# Patient Record
Sex: MALE | Race: WHITE | HISPANIC OR LATINO | Employment: FULL TIME | URBAN - METROPOLITAN AREA
[De-identification: names, ages, dates, MRNs, and addresses within clinical notes are randomized per-mention and may not be internally consistent; named-entity substitution may affect disease eponyms.]

---

## 2018-02-19 ENCOUNTER — OFFICE VISIT (OUTPATIENT)
Dept: OCCUPATIONAL MEDICINE | Facility: CLINIC | Age: 58
End: 2018-02-19
Payer: OTHER MISCELLANEOUS

## 2018-02-19 VITALS
HEIGHT: 67 IN | SYSTOLIC BLOOD PRESSURE: 140 MMHG | DIASTOLIC BLOOD PRESSURE: 92 MMHG | BODY MASS INDEX: 31.71 KG/M2 | HEART RATE: 84 BPM | TEMPERATURE: 99 F | WEIGHT: 202 LBS

## 2018-02-19 DIAGNOSIS — Z02.83 ENCOUNTER FOR DRUG SCREENING: ICD-10-CM

## 2018-02-19 DIAGNOSIS — S65.311A LACERATION OF DEEP PALMAR ARCH OF RIGHT HAND, INITIAL ENCOUNTER: Primary | ICD-10-CM

## 2018-02-19 LAB — POC BREATH ALCOHOL: NORMAL

## 2018-02-19 PROCEDURE — 3008F BODY MASS INDEX DOCD: CPT | Mod: S$GLB,,, | Performed by: PHYSICIAN ASSISTANT

## 2018-02-19 PROCEDURE — 12001 RPR S/N/AX/GEN/TRNK 2.5CM/<: CPT | Mod: S$GLB,,, | Performed by: PHYSICIAN ASSISTANT

## 2018-02-19 PROCEDURE — 80305 DRUG TEST PRSMV DIR OPT OBS: CPT | Mod: S$GLB,,, | Performed by: PREVENTIVE MEDICINE

## 2018-02-19 PROCEDURE — 82075 ASSAY OF BREATH ETHANOL: CPT | Mod: S$GLB,,, | Performed by: PHYSICIAN ASSISTANT

## 2018-02-19 PROCEDURE — 90714 TD VACC NO PRESV 7 YRS+ IM: CPT | Mod: S$GLB,,, | Performed by: PREVENTIVE MEDICINE

## 2018-02-19 PROCEDURE — 90471 IMMUNIZATION ADMIN: CPT | Mod: S$GLB,,, | Performed by: PREVENTIVE MEDICINE

## 2018-02-19 PROCEDURE — 99203 OFFICE O/P NEW LOW 30 MIN: CPT | Mod: 25,S$GLB,, | Performed by: PHYSICIAN ASSISTANT

## 2018-02-19 RX ORDER — ATORVASTATIN CALCIUM 20 MG/1
20 TABLET, FILM COATED ORAL DAILY
COMMUNITY

## 2018-02-19 RX ORDER — GLIMEPIRIDE 1 MG/1
1 TABLET ORAL
COMMUNITY

## 2018-02-19 RX ORDER — ACETAMINOPHEN AND CODEINE PHOSPHATE 300; 30 MG/1; MG/1
1 TABLET ORAL EVERY 6 HOURS PRN
Qty: 20 TABLET | Refills: 0 | Status: SHIPPED | OUTPATIENT
Start: 2018-02-19 | End: 2018-02-26

## 2018-02-19 RX ORDER — DOXYCYCLINE HYCLATE 100 MG
100 TABLET ORAL 2 TIMES DAILY
Qty: 14 TABLET | Refills: 0 | Status: SHIPPED | OUTPATIENT
Start: 2018-02-19 | End: 2018-02-21 | Stop reason: SDUPTHER

## 2018-02-19 NOTE — PROCEDURES
Laceration Repair  Date/Time: 2/19/2018 12:36 PM  Performed by: JENN HARMAN  Authorized by: JENN HARMAN   Body area: upper extremity  Location details: right hand  Laceration length: 1.5 cm  Tendon involvement: none  Nerve involvement: none  Vascular damage: no  Anesthesia: local infiltration    Anesthesia:  Local Anesthetic: lidocaine 1% without epinephrine  Anesthetic total: 5 mL  Preparation: Patient was prepped and draped in the usual sterile fashion.  Irrigation solution: betadine/tap water solution.  Irrigation method: syringe  Amount of cleaning: standard  Debridement: none  Degree of undermining: none  Skin closure: 4-0 Prolene  Number of sutures: 4  Technique: simple  Approximation: close  Approximation difficulty: simple  Dressing: dressing applied  Patient tolerance: Patient tolerated the procedure well with no immediate complications

## 2018-02-19 NOTE — PATIENT INSTRUCTIONS
Extremity Laceration: Sutures, Staples, or Tape  A laceration is a cut through the skin. If it is deep, it may require stitches (sutures) or staples to close so it can heal. Minor cuts may be treated with surgical tape closures.   X-rays may be done if something may have entered the skin through the cut. You may also need a tetanus shot if you are not up to date on this vaccination.  Home care  · Follow the health care providers instructions on how to care for the cut.  · Wash your hands with soap and warm water before and after caring for your wound. This is to help prevent infection.  · Keep the wound clean and dry. If a bandage was applied and it becomes wet or dirty, replace it. Otherwise, leave it in place for the first 24 hours, then change it once a day or as directed.  · If sutures or staples were used, clean the wound daily:  · After removing the bandage, wash the area with soap and water. Use a wet cotton swab to loosen and remove any blood or crust that forms.  · After cleaning, keep the wound clean and dry. Talk with your doctor before applying any antibiotic ointment to the wound. Reapply the bandage.  · You may remove the bandage to shower as usual after the first 24 hours, but do not soak the area in water (no swimming) until the stitches or staples are removed.  · If surgical tape closures were used, keep the area clean and dry. If it becomes wet, blot it dry with a towel.  · The doctor may prescribe an antibiotic cream or ointment to prevent infection. Do not stop taking this medication until you have finished the prescribed course or the doctor tells you to stop. The doctor may also prescribe medications for pain. Follow the doctors instructions for taking these medications.  · Avoid activities that may reopen your wound.  Follow-up care  Follow up with your health care provider. Most skin wounds heal within ten days. However, an infection may sometimes occur despite proper treatment.  Therefore, check the wound daily for the signs of infection listed below. Stitches and staples should be removed within 7-14 days. If surgical tape closures were used, you may remove them after 10 days if they have not fallen off by then.   When to seek medical advice  Call your health care provider right away if any of these occur:  · Wound bleeding not controlled by direct pressure  · Signs of infection, including increasing pain in the wound, increasing wound redness or swelling, or pus or bad odor coming from the wound  · Fever of 100.4°F (38ºC) or higher or as directed by your healthcare provider  · Stitches or staples come apart or fall out or surgical tape falls off before 7 days  · Wound edges re-open  · Wound changes colors  · Numbness around the wound   · Decreased movement around the injured area  Date Last Reviewed: 6/14/2015  © 2677-0208 [employer] [place]. [address] [place] [address], [place] [address]. All rights reserved. This information is not intended as a substitute for professional medical care. Always follow your healthcare professional's instructions.

## 2018-02-19 NOTE — PROGRESS NOTES
Subjective:       Patient ID: Moi Matta is a 57 y.o. male.    Chief Complaint: Laceration (02/19/18 RIGHT HAND)    PT is an  at [employer].  He was walkling and tripped over a wire cutting his RIGHT HAND on an angle iron.  His TD is not up to date.  Ct      Laceration    The incident occurred 1 to 3 hours ago. The laceration is located on the right hand. The laceration mechanism was a metal edge. The pain is at a severity of 2/10. The pain is mild. The pain has been constant since onset. It is unknown if a foreign body is present. His tetanus status is out of date.     Review of Systems   Constitution: Negative for chills, fever and weakness.   HENT: Negative for congestion, ear pain, nosebleeds and tinnitus.    Eyes: Negative for blurred vision and pain.   Cardiovascular: Negative for chest pain and palpitations.   Respiratory: Negative for cough, shortness of breath and wheezing.    Hematologic/Lymphatic: Does not bruise/bleed easily.   Skin: Negative for dry skin, itching, poor wound healing, rash and skin cancer.   Musculoskeletal: Negative for back pain, joint pain, muscle weakness, neck pain and stiffness.   Gastrointestinal: Negative for abdominal pain, constipation, diarrhea, nausea and vomiting.   Genitourinary: Negative for dysuria and hematuria.   Neurological: Negative for dizziness, headaches, numbness and seizures.   Allergic/Immunologic: Negative for hives.       Objective:      Physical Exam   Constitutional: He appears well-developed and well-nourished. He is active.   HENT:   Head: Normocephalic and atraumatic.   Right Ear: Hearing and external ear normal.   Left Ear: Hearing and external ear normal.   Nose: Nose normal. No epistaxis.   Mouth/Throat: Oropharynx is clear and moist and mucous membranes are normal.   Eyes: Conjunctivae and lids are normal.   Neck: Trachea normal.   Cardiovascular: Intact distal pulses and normal pulses.    Pulmonary/Chest: Effort normal. No stridor. No  respiratory distress.   Musculoskeletal: Normal range of motion.        Right hand: He exhibits laceration. He exhibits normal range of motion, no tenderness, normal capillary refill and no swelling. Normal sensation noted.        Hands:  Neurological: He is alert. He is not disoriented. No sensory deficit. GCS eye subscore is 4. GCS verbal subscore is 5. GCS motor subscore is 6.   Skin: Skin is warm and dry. Capillary refill takes less than 2 seconds. Laceration noted.   Psychiatric: He has a normal mood and affect. His speech is normal and behavior is normal.   Nursing note and vitals reviewed.      Assessment:       1. Laceration of deep palmar arch of right hand, initial encounter    2. Encounter for drug screening        Plan:           Medications Ordered This Encounter      acetaminophen-codeine 300-30mg (TYLENOL #3) 300-30 mg Tab          Sig: Take 1 tablet by mouth every 6 (six) hours as needed (Take off duty only.).          Dispense:  20 tablet          Refill:  0      doxycycline (VIBRA-TABS) 100 MG tablet          Sig: Take 1 tablet (100 mg total) by mouth 2 (two) times daily.          Dispense:  14 tablet          Refill:  0  Patient Instructions: Keep dressing clean/dry/covered   Restrictions: Regular Duty  Follow-up in about 2 days (around 2/21/2018).

## 2018-02-19 NOTE — LETTER
[facility] - [place] [address]  [place] [address]  Phone: [phone]  Fax: [phone]    Pt Name: Moi Matta  Injury Date: 02/19/2018   Employee ID:  Date of Treatment: 02/19/2018   Company: [employer]            Appointment Time: 09:45 AM Arrived: 10:00 AM CST   Appointment Date: [unfilled] Time Out:1205 PM    Physician: Omero Vasquez PA-C        Office Treatment: Moi was seen today for laceration.    Diagnoses and all orders for this visit: EXAM, TD BOOSTER, SUTURES, DRUG SCREEN, ALCOHOL SCREEN, REGULAR DUTY    Laceration of deep palmar arch of right hand, initial encounter  -     Td Vaccine (Adult)  -     doxycycline (VIBRA-TABS) 100 MG tablet; Take 1 tablet (100 mg total) by mouth 2 (two) times daily.  -     acetaminophen-codeine 300-30mg (TYLENOL #3) 300-30 mg Tab; Take 1 tablet by mouth every 6 (six) hours as needed (Take off duty only.).  Encounter for drug screening  -     POCT alcohol breath test     Patient Instructions: Keep dressing clean/dry/covered    Restrictions: Regular Duty       Return Appointment: 2/21/2018 at 0930 AM

## 2018-02-21 ENCOUNTER — OFFICE VISIT (OUTPATIENT)
Dept: OCCUPATIONAL MEDICINE | Facility: CLINIC | Age: 58
End: 2018-02-21
Payer: OTHER MISCELLANEOUS

## 2018-02-21 DIAGNOSIS — S65.311A LACERATION OF DEEP PALMAR ARCH OF RIGHT HAND, INITIAL ENCOUNTER: ICD-10-CM

## 2018-02-21 DIAGNOSIS — S61.411D LACERATION OF RIGHT HAND WITHOUT FOREIGN BODY, SUBSEQUENT ENCOUNTER: ICD-10-CM

## 2018-02-21 PROBLEM — S61.411A LACERATION OF RIGHT HAND WITHOUT FOREIGN BODY: Status: ACTIVE | Noted: 2018-02-21

## 2018-02-21 PROCEDURE — 99024 POSTOP FOLLOW-UP VISIT: CPT | Mod: S$GLB,,, | Performed by: PHYSICIAN ASSISTANT

## 2018-02-21 RX ORDER — DOXYCYCLINE 100 MG/1
100 CAPSULE ORAL EVERY 12 HOURS
Qty: 14 CAPSULE | Refills: 0 | Status: SHIPPED | OUTPATIENT
Start: 2018-02-21 | End: 2018-02-28

## 2018-02-21 NOTE — LETTER
[facility] - [place] [address]  [place] [address]  Phone: [phone]  Fax: [phone]    Pt Name: Moi Matta  Injury Date: 02/19/2018   Employee ID:  Date of Treatment: 02/21/2018   Company: [employer]            Appointment Time: 09:15 AM Arrived:  9:30 AM CST   Appointment Date: 02/21/2018 Time Out: 10:10 AM   Physician: Omero Vasquez PA-C        Office Treatment: Moi was seen today for hand injury.    Diagnoses and all orders for this visit: EXAM, WOUND DRESSING, FOLLOW UP IN ONE WEEK, REGULAR DUTY    Laceration of right hand without foreign body, subsequent encounter    Laceration of deep palmar arch of right hand, initial encounter  -     doxycycline (MONODOX) 100 MG capsule; Take 1 capsule (100 mg total) by mouth every 12 (twelve) hours.     Patient Instructions: Keep dressing clean/dry/covered, Do not apply ointments or creams unless directed    Restrictions: Regular Duty       Return Appointment: 02/28/2018 @ 9:30 AM

## 2018-02-21 NOTE — PROGRESS NOTES
Subjective:       Patient ID: Moi Matta is a 57 y.o. male.    Chief Complaint: Hand Injury (02/19/18)    Pt returned to the clinic today for a follow up visit for a right hand laceration. Pt states his injury has improved since his last office visit but he still has some tenderness. Pt also states when he went to fill his scripts he was not given his antibiotics. IJ        Hand Injury    His dominant hand is their right hand. The incident occurred 3 to 5 days ago. The incident occurred at work. The injury mechanism was a direct blow. The pain is present in the right hand. The quality of the pain is described as aching. The pain does not radiate. The pain is at a severity of 1/10. The pain is mild. The pain has been improving since the incident. Pertinent negatives include no chest pain. The symptoms are aggravated by lifting. He has tried NSAIDs for the symptoms. The treatment provided mild relief.     Review of Systems   Constitution: Negative for chills and fever.   HENT: Negative for sore throat.    Eyes: Negative for blurred vision.   Cardiovascular: Negative for chest pain.   Respiratory: Negative for shortness of breath.    Skin: Negative for color change, poor wound healing and rash.   Musculoskeletal: Positive for stiffness. Negative for back pain and joint pain.   Gastrointestinal: Negative for abdominal pain, diarrhea, nausea and vomiting.   Neurological: Negative for headaches.   Psychiatric/Behavioral: The patient is not nervous/anxious.        Objective:      Physical Exam   Constitutional: He appears well-developed and well-nourished. He is active.   HENT:   Head: Normocephalic and atraumatic.   Right Ear: Hearing and external ear normal.   Left Ear: Hearing and external ear normal.   Nose: Nose normal. No epistaxis.   Mouth/Throat: Oropharynx is clear and moist and mucous membranes are normal.   Eyes: Conjunctivae and lids are normal.   Neck: Trachea normal.   Cardiovascular: Intact distal  pulses and normal pulses.    Pulmonary/Chest: Effort normal. No stridor. No respiratory distress.   Musculoskeletal: Normal range of motion.        Right hand: He exhibits normal range of motion, normal capillary refill, no deformity and no swelling. Normal sensation noted.   Neurological: He is alert. He is not disoriented. No sensory deficit. GCS eye subscore is 4. GCS verbal subscore is 5. GCS motor subscore is 6.   Skin: Skin is warm and dry. Capillary refill takes less than 2 seconds. Laceration (right hand, healing well, sutures intact, good approximation, no SSI.) noted.   Psychiatric: He has a normal mood and affect. His speech is normal and behavior is normal.   Nursing note reviewed.      Assessment:       1. Laceration of right hand without foreign body, subsequent encounter    2. Laceration of deep palmar arch of right hand, initial encounter        Plan:           Medications Ordered This Encounter      doxycycline (MONODOX) 100 MG capsule          Sig: Take 1 capsule (100 mg total) by mouth every 12 (twelve) hours.          Dispense:  14 capsule          Refill:  0  Patient Instructions: Keep dressing clean/dry/covered, Do not apply ointments or creams unless directed   Restrictions: Regular Duty  Follow-up in about 1 week (around 2/28/2018).

## 2018-02-28 ENCOUNTER — OFFICE VISIT (OUTPATIENT)
Dept: OCCUPATIONAL MEDICINE | Facility: CLINIC | Age: 58
End: 2018-02-28
Payer: OTHER MISCELLANEOUS

## 2018-02-28 DIAGNOSIS — S61.411D LACERATION OF RIGHT HAND WITHOUT FOREIGN BODY, SUBSEQUENT ENCOUNTER: Primary | ICD-10-CM

## 2018-02-28 PROBLEM — S61.411A LACERATION OF RIGHT HAND WITHOUT FOREIGN BODY: Status: RESOLVED | Noted: 2018-02-21 | Resolved: 2018-02-28

## 2018-02-28 PROCEDURE — 99024 POSTOP FOLLOW-UP VISIT: CPT | Mod: S$GLB,,, | Performed by: PHYSICIAN ASSISTANT

## 2018-02-28 NOTE — PROGRESS NOTES
Subjective:       Patient ID: Moi Matta is a 57 y.o. male.    Chief Complaint: Hand Injury (right)    Patient returns for a follow up to a wound suture on his right hand      Hand Injury    His dominant hand is their right hand. Incident onset: 02/19/2018. The incident occurred at work. Injury mechanism: laceration. The pain is present in the right hand. The pain does not radiate. The pain is at a severity of 0/10. Pertinent negatives include no chest pain or numbness.     Review of Systems   Constitution: Negative for chills, fever, weakness and malaise/fatigue.   HENT: Negative for nosebleeds.    Cardiovascular: Negative for chest pain and syncope.   Respiratory: Negative for shortness of breath.    Skin: Negative for color change and poor wound healing.   Musculoskeletal: Negative for back pain, joint pain and neck pain.   Gastrointestinal: Negative for abdominal pain.   Genitourinary: Negative for hematuria.   Neurological: Negative for dizziness and numbness.   All other systems reviewed and are negative.      Objective:      Physical Exam   Constitutional: He appears well-developed and well-nourished. He is active.   HENT:   Head: Normocephalic and atraumatic.   Right Ear: Hearing and external ear normal.   Left Ear: Hearing and external ear normal.   Nose: Nose normal. No epistaxis.   Mouth/Throat: Oropharynx is clear and moist and mucous membranes are normal.   Eyes: Conjunctivae and lids are normal.   Neck: Trachea normal.   Cardiovascular: Intact distal pulses and normal pulses.    Pulmonary/Chest: Effort normal. No stridor. No respiratory distress.   Musculoskeletal: Normal range of motion.   Neurological: He is alert. He is not disoriented. No sensory deficit. GCS eye subscore is 4. GCS verbal subscore is 5. GCS motor subscore is 6.   Skin: Skin is warm and dry. Capillary refill takes less than 2 seconds. Laceration (right hand, healing well, no SSI, sutures intact, wound closed. ) noted.    Psychiatric: He has a normal mood and affect. His speech is normal and behavior is normal.   Nursing note reviewed.      Assessment:       1. Laceration of right hand without foreign body, subsequent encounter        Plan:            Patient Instructions: Keep dressing clean/dry/covered (Continue doxycycline until complete. Naproxen as needed for pain. )   Restrictions: Regular Duty, Discharged from Occupational Health  Follow-up if symptoms worsen or fail to improve.

## 2018-02-28 NOTE — PROCEDURES
Suture Removal  Date/Time: 2/28/2018 10:22 AM  Location procedure was performed: [facility]  Performed by: JENN HARMAN  Authorized by: JENN HARMAN   Pre-operative diagnosis: Laceration right hand  Post-operative diagnosis: Laceration right hand  Body area: upper extremity  Location details: right hand  Wound Appearance: clean, well healed, normal color, nontender and no drainage  Sutures Removed: 4  Post-removal: bandaid applied  Facility: sutures placed in this facility  Complications: No  Estimated blood loss (mL): 0  Specimens: No  Implants: No  Patient tolerance: Patient tolerated the procedure well with no immediate complications

## 2018-02-28 NOTE — LETTER
[facility]  [place]  [address] [place] [address]  Phone: [phone]  Fax: [phone]    Pt Name: Moi Matta  Injury Date: 02/19/2018   Employee ID: [id number] Date of Treatment: 02/28/2018   Company: [employer]            Appointment Time: 09:15 AM Arrived:  9:30 AM CST   Physician: Omero Vasquez PA-C Time Out:10:10 AM       Office Treatment: Moi was seen today for hand injury.    Diagnoses and all orders for this visit:    Laceration of right hand without foreign body, subsequent encounter       Patient Instructions: Keep dressing clean/dry/covered (Continue doxycycline until complete. Naproxen as needed for pain. )    Restrictions: Regular Duty, Discharged from Occupational Health       Return Appointment: DISCHARGED

## 2020-04-29 ENCOUNTER — OFFICE VISIT (OUTPATIENT)
Dept: URGENT CARE | Facility: CLINIC | Age: 60
End: 2020-04-29
Payer: COMMERCIAL

## 2020-04-29 VITALS
HEART RATE: 74 BPM | OXYGEN SATURATION: 98 % | TEMPERATURE: 99 F | DIASTOLIC BLOOD PRESSURE: 81 MMHG | WEIGHT: 202 LBS | RESPIRATION RATE: 18 BRPM | HEIGHT: 67 IN | SYSTOLIC BLOOD PRESSURE: 126 MMHG | BODY MASS INDEX: 31.71 KG/M2

## 2020-04-29 DIAGNOSIS — Z02.6 ENCOUNTER RELATED TO WORKER'S COMPENSATION CLAIM: ICD-10-CM

## 2020-04-29 DIAGNOSIS — S70.01XA CONTUSION OF RIGHT HIP, INITIAL ENCOUNTER: Primary | ICD-10-CM

## 2020-04-29 DIAGNOSIS — M25.551 RIGHT HIP PAIN: ICD-10-CM

## 2020-04-29 PROCEDURE — 73502 X-RAY EXAM HIP UNI 2-3 VIEWS: CPT | Mod: FY,RT,S$GLB, | Performed by: RADIOLOGY

## 2020-04-29 PROCEDURE — 99203 PR OFFICE/OUTPT VISIT, NEW, LEVL III, 30-44 MIN: ICD-10-PCS | Mod: S$GLB,,, | Performed by: PHYSICIAN ASSISTANT

## 2020-04-29 PROCEDURE — 80305 OOH NON-DOT DRUG SCREEN: ICD-10-PCS | Mod: S$GLB,,, | Performed by: PHYSICIAN ASSISTANT

## 2020-04-29 PROCEDURE — 73502 XR HIP 2 VIEW RIGHT: ICD-10-PCS | Mod: FY,RT,S$GLB, | Performed by: RADIOLOGY

## 2020-04-29 PROCEDURE — 99203 OFFICE O/P NEW LOW 30 MIN: CPT | Mod: S$GLB,,, | Performed by: PHYSICIAN ASSISTANT

## 2020-04-29 PROCEDURE — 80305 DRUG TEST PRSMV DIR OPT OBS: CPT | Mod: S$GLB,,, | Performed by: PHYSICIAN ASSISTANT

## 2020-04-29 RX ORDER — AMLODIPINE BESYLATE 5 MG/1
5 TABLET ORAL
COMMUNITY
Start: 2019-08-12

## 2020-04-29 NOTE — PROGRESS NOTES
Subjective:       Patient ID: Moi Matta is a 59 y.o. male.    Chief Complaint: Back Pain    Mr. Matta presents for evaluation right hip pain s/p fall at work yesterday.  This occurred around 330pm.  He was walking down a concrete ramp when he and fell on his right hip.  He denies have any head trauma or loss of consciousness.  He denies any other pain.  The pain is worse with walking, improved with rest.  He denies any neck or back pain.  He denies any radiating leg pain, paresthesias, weakness, saddle anesthesia or B/B dysfunction.  He took an Aleve this morning with some relief of pain.    Back Pain   This is a new problem. The current episode started yesterday. The problem occurs constantly. The problem is unchanged. Pain location: rt hip pain. The quality of the pain is described as aching. The pain is at a severity of 9/10. The pain is severe. The pain is the same all the time. The symptoms are aggravated by lying down. Stiffness is present all day. Pertinent negatives include no abdominal pain, bladder incontinence, bowel incontinence, dysuria or numbness. He has tried nothing for the symptoms.       Constitution: Negative for fatigue.   Gastrointestinal: Negative for abdominal pain and bowel incontinence.   Genitourinary: Negative for dysuria, urgency, bladder incontinence and hematuria.   Musculoskeletal: Positive for pain, trauma and joint pain. Negative for back pain, muscle cramps and history of spine disorder.   Skin: Negative for rash.   Neurological: Negative for coordination disturbances, numbness and tingling.        Objective:      Physical Exam   Constitutional: He is oriented to person, place, and time. He appears well-developed and well-nourished.   HENT:   Head: Normocephalic and atraumatic.   Right Ear: Hearing and external ear normal.   Left Ear: Hearing and external ear normal.   Nose: Nose normal. No rhinorrhea or nasal deformity.   Mouth/Throat: Uvula is midline, oropharynx is clear  and moist and mucous membranes are normal.   Eyes: Pupils are equal, round, and reactive to light. Conjunctivae and EOM are normal.   Neck: Normal range of motion.   Cardiovascular: Normal rate and regular rhythm.   Pulmonary/Chest: Effort normal and breath sounds normal.   Musculoskeletal:        Right hip: He exhibits tenderness. He exhibits normal range of motion, normal strength, no bony tenderness, no swelling, no crepitus, no deformity and no laceration.        Legs:  No midline C/T/L spine TTP.  Full ROM C/T/L spine.  TTP right posterior & lateral hip.  No pain with ext/int rotation.    Neurological: He is alert and oriented to person, place, and time.   Skin: Skin is warm and dry.       XR R hip - Two views right hip: No fracture dislocation bone destruction seen.  There is mild DJD and impingement change.    Assessment:       1. Contusion of right hip, initial encounter    2. Encounter related to worker's compensation claim    3. Right hip pain        Plan:       XR R hip is neg.  He will follow up with occupational health tomorrow.  No work until follow up.     Patient Instructions   PLEASE READ YOUR DISCHARGE INSTRUCTIONS ENTIRELY AS IT CONTAINS IMPORTANT INFORMATION.  - Rest.    - Drink plenty of fluids.    - Tylenol or Ibuprofen as directed as needed for fever/pain.    - If you were prescribed antibiotics, please take them to completion.  - If you are female and on birth control pills - please use additional methods of contraception to prevent pregnancy while on antibiotics and for one cycle after.   - If you were prescribed a narcotic medication or muscle relaxer, do not drive or operate heavy equipment or machinery while taking these medications, as they can cause drowsiness.   - If you smoke, please stop smoking.  -You must understand that you've received an Urgent Care treatment only and that you may be released before all your medical problems are known or treated. You, the patient, will    arrange  for follow up care as instructed. Please arrange follow up with your primary medical clinic as soon as possible.   - Follow up with your PCP or specialty clinic as directed in the next 1-2 weeks if not improved or as needed.  You can call [phone] to schedule an appointment with the appropriate provider.    - Please return to Urgent Care or to the Emergency Department if your symptoms worsen.    Patient aware and verbalized understanding.    Hip Contusion    A contusion is another word for a bruise. It happens when small blood vessels break open and leak blood into the nearby area. A hip contusion can result from a bump, hit, or fall. Symptoms of a contusion often include changes in skin color (bruising), swelling, and pain. It may take several hours for a deep bruise to show up. If the injury is severe, you may need an X-ray to check for broken bones. Swelling should decrease in a few days. Bruising and pain may take several weeks to go away.  Home care  · Unless another medicine was prescribed, you may take acetaminophen, ibuprofen, or naproxen to help relieve pain and swelling. If needed, stronger pain medicines may be prescribed. Take all medicines exactly as directed.  · Ice the bruised area to help reduce pain and swelling. Wrap a cold source (ice pack or ice cubes in a plastic bag) in a thin towel. Apply the cold source to the bruised area for 20 minutes every 1 to 2 hours the first day. Continue this 3 to 4 times a day until the pain and swelling goes away.  · If walking causes pain, use crutches or a walker until you can walk without pain. These items can be rented at most pharmacies and orthopedic supply stores.  · If your injury is keeping you from moving around or caring for yourself properly, you may qualify for services such as home healthcare. Check with your doctor and insurance company to see if this type of care is covered.  Follow-up  Follow up with your healthcare provider, or as  advised.  When to seek medical advice   Call your healthcare provider right away if any of these occur:  · Increased pain, bruising, or swelling near the injured area  · Decreased ability to bear weight on the injured side  · Pain or swelling develops below the knee  · Chest pain or shortness of breath  Date Last Reviewed: 4/1/2017  © 1048-2478 [employer]. [address] [place] [address]. All rights reserved. This information is not intended as a substitute for professional medical care. Always follow your healthcare professional's instructions.                      No follow-ups on file.

## 2020-04-29 NOTE — LETTER
[facility] - [place] [address]  [place] [address]  Phone: [phone]  Fax: [phone]  [facility]: [email or web address]    Pt Name: Moi Matta  Injury Date: 04/28/2020   Employee ID:  Date of First Treatment: 04/29/2020   Company: [employer]      Appointment Time: 09:40 AM Arrived: 0955am   Provider: Wanda Sung PA-C Time Out: 11:31am     Office Treatment:   1. Contusion of right hip, initial encounter    2. Encounter related to worker's compensation claim    3. Right hip pain                     Please do not return to work until you follow up with occupational health tomorrow.     Return Appointment: Visit date 4/30/2020

## 2020-04-29 NOTE — PATIENT INSTRUCTIONS
PLEASE READ YOUR DISCHARGE INSTRUCTIONS ENTIRELY AS IT CONTAINS IMPORTANT INFORMATION.  - Rest.    - Drink plenty of fluids.    - Tylenol or Ibuprofen as directed as needed for fever/pain.    - If you were prescribed antibiotics, please take them to completion.  - If you are female and on birth control pills - please use additional methods of contraception to prevent pregnancy while on antibiotics and for one cycle after.   - If you were prescribed a narcotic medication or muscle relaxer, do not drive or operate heavy equipment or machinery while taking these medications, as they can cause drowsiness.   - If you smoke, please stop smoking.  -You must understand that you've received an Urgent Care treatment only and that you may be released before all your medical problems are known or treated. You, the patient, will    arrange for follow up care as instructed. Please arrange follow up with your primary medical clinic as soon as possible.   - Follow up with your PCP or specialty clinic as directed in the next 1-2 weeks if not improved or as needed.  You can call [phone] to schedule an appointment with the appropriate provider.    - Please return to Urgent Care or to the Emergency Department if your symptoms worsen.    Patient aware and verbalized understanding.    Hip Contusion    A contusion is another word for a bruise. It happens when small blood vessels break open and leak blood into the nearby area. A hip contusion can result from a bump, hit, or fall. Symptoms of a contusion often include changes in skin color (bruising), swelling, and pain. It may take several hours for a deep bruise to show up. If the injury is severe, you may need an X-ray to check for broken bones. Swelling should decrease in a few days. Bruising and pain may take several weeks to go away.  Home care  · Unless another medicine was prescribed, you may take acetaminophen, ibuprofen, or naproxen to help relieve pain and swelling. If  needed, stronger pain medicines may be prescribed. Take all medicines exactly as directed.  · Ice the bruised area to help reduce pain and swelling. Wrap a cold source (ice pack or ice cubes in a plastic bag) in a thin towel. Apply the cold source to the bruised area for 20 minutes every 1 to 2 hours the first day. Continue this 3 to 4 times a day until the pain and swelling goes away.  · If walking causes pain, use crutches or a walker until you can walk without pain. These items can be rented at most pharmacies and orthopedic supply stores.  · If your injury is keeping you from moving around or caring for yourself properly, you may qualify for services such as home healthcare. Check with your doctor and insurance company to see if this type of care is covered.  Follow-up  Follow up with your healthcare provider, or as advised.  When to seek medical advice   Call your healthcare provider right away if any of these occur:  · Increased pain, bruising, or swelling near the injured area  · Decreased ability to bear weight on the injured side  · Pain or swelling develops below the knee  · Chest pain or shortness of breath  Date Last Reviewed: 4/1/2017  © 5034-5598 [employer]. [address] [place] [address], [place] [address]. All rights reserved. This information is not intended as a substitute for professional medical care. Always follow your healthcare professional's instructions.

## 2020-04-30 ENCOUNTER — OFFICE VISIT (OUTPATIENT)
Dept: URGENT CARE | Facility: CLINIC | Age: 60
End: 2020-04-30
Payer: COMMERCIAL

## 2020-04-30 VITALS
DIASTOLIC BLOOD PRESSURE: 81 MMHG | SYSTOLIC BLOOD PRESSURE: 132 MMHG | HEIGHT: 66 IN | HEART RATE: 65 BPM | OXYGEN SATURATION: 98 % | TEMPERATURE: 99 F | WEIGHT: 206 LBS | BODY MASS INDEX: 33.11 KG/M2

## 2020-04-30 DIAGNOSIS — S70.01XD CONTUSION OF RIGHT HIP, SUBSEQUENT ENCOUNTER: ICD-10-CM

## 2020-04-30 DIAGNOSIS — M25.551 RIGHT HIP PAIN: Primary | ICD-10-CM

## 2020-04-30 PROCEDURE — 99213 OFFICE O/P EST LOW 20 MIN: CPT | Mod: S$GLB,,, | Performed by: PREVENTIVE MEDICINE

## 2020-04-30 PROCEDURE — 99213 PR OFFICE/OUTPT VISIT, EST, LEVL III, 20-29 MIN: ICD-10-PCS | Mod: S$GLB,,, | Performed by: PREVENTIVE MEDICINE

## 2020-04-30 NOTE — LETTER
[facility]  [address] [facility], [address]  [facility] [address]  Phone: [phone]  Fax: [phone]  [facility]: [id number]    Pt Name: Moi Matta  Injury Date: 02/19/2018   Employee ID:  Date of  Treatment: 04/30/2020   Company: [employer]      Appointment Time: 08:15 AM Arrived: 08:30  am   Provider: Bear Oglesby MD Time Out:09:28  am     Office Treatment:   EXAM  DISCHARGED FROM OCCUPATIONAL HEALTH      1. Right hip pain    2. Contusion of right hip, subsequent encounter          Patient Instructions: Daily home exercises/warm soaks      Restrictions: Regular Duty, Discharged from Occupational Health(May resume regular duty on April 30, 2020)     Return Appointment: DISCHARGED

## 2020-04-30 NOTE — PROGRESS NOTES
Subjective:       Patient ID: Moi Matta is a 59 y.o. male.    Chief Complaint: Hip Injury (right)    NEW - WC- pt is being seen today for his right hip injury at work -[employer]-04/28/2020.PT states that  right hip pain s/p fall at work 04/28/2020,  This occurred around 330pm.  He was walking down a concrete ramp when he and fell on his right hip.  He denies have any head trauma or loss of consciousness.  He denies any other pain. Pt have pain when going to sleep no pain when he walk,  He denies any neck or back pain.  He denies any radiating leg pain, paresthesias, .  He took an Aleve  Last night  with some relief of pain, Pt went to [facility] [place] at the time of the accident with X-Ray done .-LN    Hip Injury   This is a new problem. Associated symptoms include arthralgias. Pertinent negatives include no abdominal pain, chills, congestion, headaches, joint swelling, myalgias, nausea, numbness or weakness. The symptoms are aggravated by standing. He has tried nothing for the symptoms.   Back Pain   This is a new problem. The current episode started yesterday. The problem occurs constantly. The problem is unchanged. Pain location: rt hip pain. The quality of the pain is described as aching. The pain is at a severity of 9/10. The pain is severe. The pain is the same all the time. The symptoms are aggravated by lying down. Stiffness is present all day. Pertinent negatives include no abdominal pain, headaches, numbness or weakness. He has tried nothing for the symptoms.       Constitution: Negative for chills.   HENT: Negative for congestion, sinus pain and sinus pressure.    Gastrointestinal: Negative for abdominal pain and nausea.   Musculoskeletal: Positive for pain and joint pain. Negative for joint swelling, back pain, pain with walking, muscle cramps and muscle ache.   Allergic/Immunologic: Negative for itching and sneezing.   Neurological: Negative for headaches, numbness and tingling.         Objective:      Physical Exam   Constitutional: He is oriented to person, place, and time. He appears well-developed and well-nourished.   HENT:   Head: Normocephalic and atraumatic.   Eyes: Pupils are equal, round, and reactive to light. EOM are normal.   Neck: Normal range of motion.   Cardiovascular: Normal rate.   Pulmonary/Chest: Effort normal.   Musculoskeletal:        Right hip: He exhibits normal range of motion, normal strength, no tenderness, no bony tenderness, no swelling, no crepitus, no deformity and no laceration.        Legs:  Patient has minimal pain about the right hip with no evidence of swelling or ecchymosis.  He has full range of motion of the right hip with no pain on flexion to approximately 90°, abduction to approximately 45°, and extension to approximately 45°.  He has no pain with internal external rotation of his right hip.  He has no pain with weight-bearing, heal and toe walking.  Distal pulses are equal and intact.   Neurological: He is alert and oriented to person, place, and time.   Skin: Skin is warm and dry.   Psychiatric: He has a normal mood and affect.   Nursing note and vitals reviewed.    X-ray Hip 2 Or 3 Views Right    Result Date: 4/29/2020  EXAMINATION: XR HIP 2 VIEW RIGHT CLINICAL HISTORY: right hip pain s/p fall;  Pain in right hip FINDINGS: Two views right hip: No fracture dislocation bone destruction seen.  There is mild DJD and impingement change. Electronically signed by: Margarito Quintanilla MD Date:    04/29/2020 Time:    11:28  Assessment:       1. Right hip pain    2. Contusion of right hip, subsequent encounter        Plan:     patient will use over-the-counter medications such as Advil and or Aleve as needed for pain about his right hip.       Patient Instructions: Daily home exercises/warm soaks   Restrictions: Regular Duty, Discharged from Occupational Health(May resume regular duty on April 30, 2020)  Follow up if symptoms worsen or fail to improve.

## 2020-05-02 ENCOUNTER — TELEPHONE (OUTPATIENT)
Dept: URGENT CARE | Facility: CLINIC | Age: 60
End: 2020-05-02

## 2022-12-19 NOTE — TELEPHONE ENCOUNTER
Call Back DOS 04/29/2020 - I left a message for the patient to return my call.  
appears normal and intact

## 2023-01-27 ENCOUNTER — OFFICE VISIT (OUTPATIENT)
Dept: URGENT CARE | Facility: CLINIC | Age: 63
End: 2023-01-27
Payer: COMMERCIAL

## 2023-01-27 VITALS
HEIGHT: 66 IN | SYSTOLIC BLOOD PRESSURE: 147 MMHG | TEMPERATURE: 98 F | DIASTOLIC BLOOD PRESSURE: 73 MMHG | WEIGHT: 206 LBS | OXYGEN SATURATION: 99 % | HEART RATE: 66 BPM | BODY MASS INDEX: 33.11 KG/M2 | RESPIRATION RATE: 18 BRPM

## 2023-01-27 DIAGNOSIS — M25.532 LEFT WRIST PAIN: Primary | ICD-10-CM

## 2023-01-27 DIAGNOSIS — T14.90XA INJURY: ICD-10-CM

## 2023-01-27 PROCEDURE — 99204 PR OFFICE/OUTPT VISIT, NEW, LEVL IV, 45-59 MIN: ICD-10-PCS | Mod: S$GLB,,,

## 2023-01-27 PROCEDURE — 73110 XR WRIST COMPLETE 3 VIEWS LEFT: ICD-10-PCS | Mod: FY,LT,S$GLB, | Performed by: RADIOLOGY

## 2023-01-27 PROCEDURE — 99204 OFFICE O/P NEW MOD 45 MIN: CPT | Mod: S$GLB,,,

## 2023-01-27 PROCEDURE — 73110 X-RAY EXAM OF WRIST: CPT | Mod: FY,LT,S$GLB, | Performed by: RADIOLOGY

## 2023-01-27 RX ORDER — FENOFIBRATE 48 MG/1
48 TABLET, FILM COATED ORAL
COMMUNITY
Start: 2022-11-21

## 2023-01-27 RX ORDER — MELOXICAM 15 MG/1
15 TABLET ORAL NIGHTLY
COMMUNITY
Start: 2022-11-21

## 2023-01-27 RX ORDER — ESCITALOPRAM OXALATE 10 MG/1
10 TABLET ORAL
COMMUNITY
Start: 2022-11-21

## 2023-01-27 RX ORDER — TAMSULOSIN HYDROCHLORIDE 0.4 MG/1
1 CAPSULE ORAL NIGHTLY
COMMUNITY
Start: 2022-10-07 | End: 2023-08-11

## 2023-01-27 NOTE — LETTER
[facility]  [address] [place] [address]  Phone: [phone]  Fax: [phone]  [facility]: [id number]     Name: Moi Matta  Injury Date: 01/26/2023   Employee ID:  Date of First Treatment: 01/27/2023   Company: [employer]      Appointment Time: 08:40 AM Arrived: 9:05   Provider: Rhonda Goff NP Time Out:10:55     Office Treatment:   1. Left wrist pain    2. Injury                     Return Appointment: F/U with [facility] on 1/28/23

## 2023-01-27 NOTE — PROGRESS NOTES
Subjective:       Patient ID: Moi Matta is a 62 y.o. male.    Chief Complaint: left wrist    62 yr old male came in with complaints of a left wrist injury. He was putting josé down at work and was carrying the josé and fell and hit his wrist when he fell. This injury happened yesterday and at work. He can move it but it hurts. He's also having swelling. Pt denies any numbness or tingling.  Patient's place of employment - [employer]  Patient's job title -   Date of injury - 1/26/23  Body part injured including left or right - left  Injury Mechanism - fall  What they were doing when they got hurt -  moving josé  What they did immediately after - went home  Pain scale right now - 10    Wrist Injury   The incident occurred 12 to 24 hours ago. The incident occurred at work. The injury mechanism was a fall. The pain is present in the left wrist. The quality of the pain is described as aching. The pain does not radiate. The pain is at a severity of 10/10. The pain is severe. The pain has been Constant since the incident. Associated symptoms include muscle weakness. Pertinent negatives include no chest pain, numbness or tingling. Nothing aggravates the symptoms. He has tried nothing for the symptoms.     Cardiovascular:  Negative for chest pain.   Musculoskeletal:  Positive for pain and joint swelling. Negative for abnormal ROM of joint.   Neurological:  Negative for dizziness, light-headedness, numbness and tingling.      Objective:      Physical Exam  Vitals and nursing note reviewed.   Constitutional:       General: He is not in acute distress.     Appearance: Normal appearance. He is normal weight. He is not ill-appearing.   HENT:      Head: Normocephalic and atraumatic.      Nose: Nose normal.      Mouth/Throat:      Mouth: Mucous membranes are moist.   Eyes:      Pupils: Pupils are equal, round, and reactive to light.   Cardiovascular:      Rate and Rhythm: Normal rate and  regular rhythm.      Pulses: Normal pulses.      Heart sounds: Normal heart sounds.   Pulmonary:      Effort: Pulmonary effort is normal.      Breath sounds: Normal breath sounds.   Musculoskeletal:      Left wrist: Swelling, tenderness and bony tenderness present. No deformity. Decreased range of motion.      Comments: TTP over dorsum of hand and wrist, decreased flexion and extension to wrist, mild swelling, no erythema or bruising   Skin:     General: Skin is warm and dry.   Neurological:      General: No focal deficit present.      Mental Status: He is alert and oriented to person, place, and time.   Psychiatric:         Mood and Affect: Mood normal.         Behavior: Behavior normal.         Thought Content: Thought content normal.         Judgment: Judgment normal.     XR WRIST COMPLETE 3 VIEWS LEFT    Result Date: 1/27/2023  EXAMINATION: XR WRIST COMPLETE 3 VIEWS LEFT CLINICAL HISTORY: Injury, unspecified, initial encounter TECHNIQUE: PA, lateral, and oblique views of the left wrist were performed. COMPARISON: None FINDINGS: No fracture.  No malalignment.  Preserved bone density.  Some osteoarthritic periarticular spurring and soft tissue calcifications about the mildly narrowed 1st carpal metacarpal articulation.  Otherwise, preserved joint spaces.  No opaque soft tissue foreign body.  Mild soft tissue swelling dorsally about the distal radius and ulna less so wrist.     As above Electronically signed by: Jerardo Jaramillo Date:    01/27/2023 Time:    10:27     Assessment:       1. Left wrist pain    2. Injury        Plan:                 No follow-ups on file.    Ace wrap applied. Discussed OTC tylenol/motrin as needed for pain. F/U with [facility].    Patient Instructions   R.I.C.E:    A referral was placed for you, call [phone] to schedule appointment.  Call to schedule [facility] appt [facility].    Rest, apply ice intermittently, compress with ace wrap, and elevate above heart level as much as possible.  Do  not apply ice directly to skin. Wrap ice in cloth before applying.    OTC Tylenol (acetaminophen) up to 4,000 mg a day is safe for short periods and can be used for pain, and fever. However in high doses and prolonged use it can cause liver irritation.    OTC Ibuprofen (NSAID) is a non-steroidal anti-inflammatory that can be used for pain. However it can also cause stomach irritation if over used.    Of note: Aleve, Motrin, Advil, Mobic, meloxicam, and indomethacin are also other names of NSAIDs.    OTC Voltaren topical cream may be applied for relief, as long as you do not have any allergy to the ingredients.    You will need to follow-up with orthopedics if your symptoms persist, as we discussed.

## 2023-01-27 NOTE — PATIENT INSTRUCTIONS
R.I.C.E:    A referral was placed for you, call [phone] to schedule appointment.  Call to schedule [facility] appt [facility].    Rest, apply ice intermittently, compress with ace wrap, and elevate above heart level as much as possible.  Do not apply ice directly to skin. Wrap ice in cloth before applying.    OTC Tylenol (acetaminophen) up to 4,000 mg a day is safe for short periods and can be used for pain, and fever. However in high doses and prolonged use it can cause liver irritation.    OTC Ibuprofen (NSAID) is a non-steroidal anti-inflammatory that can be used for pain. However it can also cause stomach irritation if over used.    Of note: Aleve, Motrin, Advil, Mobic, meloxicam, and indomethacin are also other names of NSAIDs.    OTC Voltaren topical cream may be applied for relief, as long as you do not have any allergy to the ingredients.    You will need to follow-up with orthopedics if your symptoms persist, as we discussed.

## 2023-01-30 ENCOUNTER — OFFICE VISIT (OUTPATIENT)
Dept: URGENT CARE | Facility: CLINIC | Age: 63
End: 2023-01-30
Payer: COMMERCIAL

## 2023-01-30 VITALS
BODY MASS INDEX: 33.66 KG/M2 | SYSTOLIC BLOOD PRESSURE: 177 MMHG | TEMPERATURE: 99 F | OXYGEN SATURATION: 96 % | RESPIRATION RATE: 18 BRPM | HEART RATE: 70 BPM | HEIGHT: 65 IN | DIASTOLIC BLOOD PRESSURE: 89 MMHG | WEIGHT: 202 LBS

## 2023-01-30 DIAGNOSIS — S63.502A LEFT WRIST SPRAIN, INITIAL ENCOUNTER: Primary | ICD-10-CM

## 2023-01-30 PROCEDURE — 99203 OFFICE O/P NEW LOW 30 MIN: CPT | Mod: S$GLB,,, | Performed by: EMERGENCY MEDICINE

## 2023-01-30 PROCEDURE — 99203 PR OFFICE/OUTPT VISIT, NEW, LEVL III, 30-44 MIN: ICD-10-PCS | Mod: S$GLB,,, | Performed by: EMERGENCY MEDICINE

## 2023-01-30 NOTE — PROGRESS NOTES
Subjective:       Patient ID: Moi Matta is a 62 y.o. male.    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 Pain score 0/10 with No complaints and only taking Aspirin. SH    Patient had a fall at work to the left hand and wrist.  Was seen at urgent care and had x-rays which were negative.  Has been working and wearing an Ace wrap.  He states I am good and ready to go back normal .  Physical exam shows no swelling or bruising, no pain in the snuffbox, normal range of motion and distally neurovascularly intact with normal  strength and full flexion extension and ulnar and radial deviation.  Symptoms resolved and discharge from occupational health work regular duty without restrictions knowing that he may return p.r.n..    Wrist Pain   Pertinent negatives include no fever, limited range of motion or numbness.   ROS    Constitution: Negative for chills and fever.   HENT:  Negative for postnasal drip, sinus pain and sore throat.    Neck: Negative for neck pain and neck stiffness.   Cardiovascular:  Negative for chest pain and palpitations.   Respiratory:  Negative for cough and shortness of breath.    Genitourinary:  Negative for dysuria and hematuria.   Musculoskeletal:  Positive for trauma. Negative for pain, joint pain, joint swelling, abnormal ROM of joint, muscle cramps and muscle ache.   Skin:  Negative for wound, laceration, erythema and bruising.   Neurological:  Negative for altered mental status, numbness and tingling.   Psychiatric/Behavioral:  Negative for altered mental status.       Objective:      Physical Exam  Vitals and nursing note reviewed.   Constitutional:       Appearance: He is well-developed.   HENT:      Head: Normocephalic and atraumatic. No abrasion, contusion or laceration.      Right Ear:  External ear normal.      Left Ear: External ear normal.      Nose: Nose normal.   Eyes:      General: Lids are normal.      Conjunctiva/sclera: Conjunctivae normal.      Pupils: Pupils are equal, round, and reactive to light.   Neck:      Trachea: Trachea and phonation normal.   Cardiovascular:      Rate and Rhythm: Normal rate and regular rhythm.      Heart sounds: Normal heart sounds.   Pulmonary:      Effort: Pulmonary effort is normal. No respiratory distress.      Breath sounds: Normal breath sounds. No stridor.   Musculoskeletal:         General: Signs of injury present. No swelling, tenderness or deformity. Normal range of motion.      Cervical back: Full passive range of motion without pain and neck supple.      Comments: PATIENT HAS NORMAL  STRENGTH OF THE LEFT HAND AND WRIST WITH NO PAIN NO SWELLING NO BRUISING AND NO TENDERNESS OF THE SNUFFBOX.  DISTALLY NEUROVASCULARLY INTACT AND NORMAL WRIST FLEXION, EXTENSION, RADIAL AND ULNAR DEVIATION WITHOUT PAIN.   Skin:     General: Skin is warm and dry.      Capillary Refill: Capillary refill takes less than 2 seconds.      Findings: No abrasion, bruising, burn, ecchymosis, erythema, laceration, lesion or rash.   Neurological:      Mental Status: He is alert and oriented to person, place, and time.   Psychiatric:         Speech: Speech normal.         Behavior: Behavior normal.         Thought Content: Thought content normal.         Judgment: Judgment normal.       XR WRIST COMPLETE 3 VIEWS LEFT    Result Date: 1/27/2023  EXAMINATION: XR WRIST COMPLETE 3 VIEWS LEFT CLINICAL HISTORY: Injury, unspecified, initial encounter TECHNIQUE: PA, lateral, and oblique views of the left wrist were performed. COMPARISON: None FINDINGS: No fracture.  No malalignment.  Preserved bone density.  Some osteoarthritic periarticular spurring and soft tissue calcifications about the mildly narrowed 1st carpal metacarpal articulation.  Otherwise, preserved joint spaces.  No opaque  soft tissue foreign body.  Mild soft tissue swelling dorsally about the distal radius and ulna less so wrist.     As above Electronically signed by: Jerardo Jaramillo Date:    01/27/2023 Time:    10:27       Assessment:       1. Left wrist sprain, initial encounter          Plan:       Patient had a fall at work to the left hand and wrist.  Was seen at urgent care and had x-rays which were negative.  Has been working and wearing an Ace wrap.  He states I am good and ready to go back normal .  Physical exam shows no swelling or bruising, no pain in the snuffbox, normal range of motion and distally neurovascularly intact with normal  strength and full flexion extension and ulnar and radial deviation.  Symptoms resolved and discharge from occupational health work regular duty without restrictions knowing that he may return p.r.n..     Patient Instructions: Attention not to aggravate affected area   Restrictions: Regular Duty, Discharged from Occupational Health  Follow up if symptoms worsen or fail to improve.

## 2023-01-30 NOTE — LETTER
[facility]  [address] [address]  Phone: [phone]  Fax: [phone]  [facility]: [email or web address]    Pt Name: Moi Matta  Injury Date: 01/26/2023   Employee ID:  Date of First Treatment: 01/30/2023   Company: [employer]      Appointment Time: 11:15 AM Arrived: 11:30   Provider: Jose Guadalupe Oliveira MD Time Out:12:53     Office Treatment:   1. Left wrist sprain, initial encounter          Patient Instructions: Attention not to aggravate affected area    Restrictions: Regular Duty, Discharged from Occupational Health

## 2023-08-08 ENCOUNTER — OFFICE VISIT (OUTPATIENT)
Dept: URGENT CARE | Facility: CLINIC | Age: 63
End: 2023-08-08
Payer: OTHER MISCELLANEOUS

## 2023-08-08 VITALS
BODY MASS INDEX: 33.32 KG/M2 | OXYGEN SATURATION: 96 % | SYSTOLIC BLOOD PRESSURE: 129 MMHG | HEIGHT: 65 IN | HEART RATE: 68 BPM | DIASTOLIC BLOOD PRESSURE: 73 MMHG | TEMPERATURE: 98 F | WEIGHT: 200 LBS

## 2023-08-08 DIAGNOSIS — Z02.6 ENCOUNTER RELATED TO WORKER'S COMPENSATION CLAIM: ICD-10-CM

## 2023-08-08 DIAGNOSIS — S70.01XA CONTUSION OF RIGHT HIP, INITIAL ENCOUNTER: Primary | ICD-10-CM

## 2023-08-08 PROCEDURE — 99213 OFFICE O/P EST LOW 20 MIN: CPT | Mod: S$GLB,,,

## 2023-08-08 PROCEDURE — 73502 XR HIP WITH PELVIS WHEN PERFORMED, 2 OR 3  VIEWS RIGHT: ICD-10-PCS | Mod: 26,RT,S$GLB, | Performed by: RADIOLOGY

## 2023-08-08 PROCEDURE — 99213 PR OFFICE/OUTPT VISIT, EST, LEVL III, 20-29 MIN: ICD-10-PCS | Mod: S$GLB,,,

## 2023-08-08 PROCEDURE — 73502 X-RAY EXAM HIP UNI 2-3 VIEWS: CPT | Mod: 26,RT,S$GLB, | Performed by: RADIOLOGY

## 2023-08-08 NOTE — LETTER
[facility]  [address] [address]  Phone: [phone]  Fax: [phone]  [facility]: [email or web address]    Pt Name: Moi Matta  Injury Date: 08/08/2023   Employee ID: [id number] Date of First Treatment: 08/08/2023   Company: [employer]      Appointment Time:  Arrived: 10:42 AM    Provider: Tuan Cuellar DO Time Out:1:03 PM      Office Treatment:   1. Contusion of right hip, initial encounter    2. Encounter related to worker's compensation claim          Patient Instructions: Attention not to aggravate affected area (Use over-the-counter ibuprofen and Tylenol as directed on label.  Also, alternate heat and cold to the affected area.)      Restrictions: No lifting/pushing/pulling more than 10 lbs (Sit or stand stretch breaks as needed.)     Return Appointment: 8/11/2023 at 10:15 AM NESTOR

## 2023-08-11 ENCOUNTER — OFFICE VISIT (OUTPATIENT)
Dept: URGENT CARE | Facility: CLINIC | Age: 63
End: 2023-08-11
Payer: OTHER MISCELLANEOUS

## 2023-08-11 VITALS
OXYGEN SATURATION: 100 % | HEART RATE: 80 BPM | SYSTOLIC BLOOD PRESSURE: 122 MMHG | HEIGHT: 66 IN | RESPIRATION RATE: 18 BRPM | DIASTOLIC BLOOD PRESSURE: 60 MMHG | BODY MASS INDEX: 34.55 KG/M2 | TEMPERATURE: 99 F | WEIGHT: 215 LBS

## 2023-08-11 DIAGNOSIS — F17.200 NEEDS SMOKING CESSATION EDUCATION: ICD-10-CM

## 2023-08-11 DIAGNOSIS — S70.01XD CONTUSION OF RIGHT HIP, SUBSEQUENT ENCOUNTER: Primary | ICD-10-CM

## 2023-08-11 DIAGNOSIS — M54.50 ACUTE LOW BACK PAIN WITHOUT SCIATICA, UNSPECIFIED BACK PAIN LATERALITY: ICD-10-CM

## 2023-08-11 DIAGNOSIS — Z02.6 ENCOUNTER RELATED TO WORKER'S COMPENSATION CLAIM: ICD-10-CM

## 2023-08-11 PROCEDURE — 99213 PR OFFICE/OUTPT VISIT, EST, LEVL III, 20-29 MIN: ICD-10-PCS | Mod: S$GLB,,, | Performed by: NURSE PRACTITIONER

## 2023-08-11 PROCEDURE — 99213 OFFICE O/P EST LOW 20 MIN: CPT | Mod: S$GLB,,, | Performed by: NURSE PRACTITIONER

## 2023-08-11 RX ORDER — ACETAMINOPHEN 500 MG
500 TABLET ORAL EVERY 6 HOURS PRN
COMMUNITY

## 2023-08-11 NOTE — LETTER
[facility]  [address] [address]  Phone: [phone]  Fax: [phone]  [facility]: [email or web address]    Pt Name: Moi Matta  Injury Date: 08/08/2023   Employee ID: [id number] Date of Treatment: 08/11/2023   Company: [employer]      Appointment Time: 10:15 AM Arrived: 9:55 AM    Provider: Catia Townsend NP Time Out:10:53 AM      Office Treatment:   1. Contusion of right hip, subsequent encounter    2. Acute low back pain without sciatica, unspecified back pain laterality    3. Encounter related to worker's compensation claim          Patient Instructions: Attention not to aggravate affected area, Daily home exercises/warm soaks (May alternate ice and heat 10 minutes.)      Restrictions: Sit or stand as needed, No lifting/pushing/pulling more than 10 lbs (Sit or stand stretch breaks as needed.)     Return Appointment: 8/16/2023 at 12:00 PM SHANTE

## 2023-08-11 NOTE — PROGRESS NOTES
Subjective:      Patient ID: Moi Matta is a 62 y.o. male.    Chief Complaint: Back Pain (low)    Patient's place of employment - [employer]  Patient's job title -   Date of Injury - 8/8/23  Body part injured - Low back  Current work status per last visit - Light   Improved, same, or worse - improved a little  Pain Scale right now (1-10) -  6    Mr. Matta reports that he is feeling a little better today.  He has been taking the over-the-counter ibuprofen and Tylenol per label directions.  Tolerating well and getting relief.  Has also been alternating ice and heat with relief.  Has been working light duty but thinks he may have overdid it today.MWT    Back Pain  Pertinent negatives include no bladder incontinence, bowel incontinence or numbness.       Constitution: Positive for activity change.   HENT: Negative.     Neck: neck negative. Negative for neck pain and neck stiffness.   Cardiovascular: Negative.    Eyes: Negative.    Respiratory: Negative.     Gastrointestinal: Negative.  Negative for bowel incontinence.   Endocrine: negative.   Genitourinary: Negative.  Negative for bladder incontinence.   Musculoskeletal:  Positive for pain, abnormal ROM of joint, back pain, pain with walking and muscle ache.   Skin: Negative.  Negative for erythema and bruising.   Neurological:  Negative for numbness and tingling.     Objective:     Physical Exam  Constitutional:       Appearance: Normal appearance.      Comments: Patient looks to be in pain.   HENT:      Right Ear: External ear normal.      Left Ear: External ear normal.   Eyes:      Conjunctiva/sclera: Conjunctivae normal.   Cardiovascular:      Pulses: Normal pulses.   Pulmonary:      Effort: Pulmonary effort is normal.   Abdominal:      General: Abdomen is flat.   Musculoskeletal:         General: Tenderness present. No swelling or deformity.      Lumbar back: Tenderness present. No swelling or deformity. Decreased range of motion. Negative right  straight leg raise test and negative left straight leg raise test.        Back:       Comments: Tenderness to palpation to lumbar region of back both central and to the right.  No radiation of pain.  Increased pain with flexion, extension and bilateral bending.  Neurovascular intact distally.  Negative bilateral straight leg raises.  No ecchymosis or spasm appreciated.  Able to get on and off exam table without too much difficulty.  Heel and toe walks well.   Skin:     General: Skin is warm and dry.      Findings: No bruising or erythema.   Neurological:      General: No focal deficit present.      Mental Status: He is alert and oriented to person, place, and time.   Psychiatric:         Mood and Affect: Mood normal.         Behavior: Behavior normal.         Thought Content: Thought content normal.         Judgment: Judgment normal.        Assessment:      1. Contusion of right hip, subsequent encounter    2. Acute low back pain without sciatica, unspecified back pain laterality    3. Encounter related to worker's compensation claim      Plan:     X-Ray Hip 2 or 3 views Right (with Pelvis when performed)    Result Date: 8/8/2023  EXAMINATION: XR HIP WITH PELVIS WHEN PERFORMED, 2 OR 3  VIEWS RIGHT TECHNIQUE: Two views of the right hip were obtained, with an AP pelvis and a lateral projection of the right hip submitted. COMPARISON: Comparison is made to 04/29/2020.  Clinical information of trauma on today's date. FINDINGS: Visualized osseous structures appear intact, with no conventional radiographic evidence of recent fracture noted.  Hip joint spaces are normally maintained, without narrowing.  No lytic destructive process.  SI joints appear unremarkable.  Multiple clips are now seen superimposed over the region of the pubic bones to both sides of midline.     No significant detrimental interval change since 04/29/2020 is appreciated.  No definite evidence of recent fracture is observed. Electronically signed  by: Mitchell Weiss MD Date:    08/08/2023 Time:    12:33    I have reviewed the hip x-rays which show no significant detrimental interval change since 2020 x-ray.  No evidence of recent fracture.    Mr. Matta will remain on light duty.  He will continue to alternate ice and heat to affected area.  He will continue to take Tylenol and ibuprofen per label directions for the pain.     Patient Instructions: Attention not to aggravate affected area, Daily home exercises/warm soaks (May alternate ice and heat 10 minutes.)   Restrictions: Sit or stand as needed, No lifting/pushing/pulling more than 10 lbs (Sit or stand stretch breaks as needed.)  Follow up in about 5 days (around 8/16/2023).    I spent a total of 20 minutes on the day of the visit.This includes face to face time and non-face to face time preparing to see the patient (eg, review of tests), obtaining and/or reviewing separately obtained history, documenting clinical information in the electronic or other health record, independently interpreting results and communicating results to the patient/family/caregiver, or care coordinator.

## 2023-08-16 ENCOUNTER — OFFICE VISIT (OUTPATIENT)
Dept: URGENT CARE | Facility: CLINIC | Age: 63
End: 2023-08-16
Payer: OTHER MISCELLANEOUS

## 2023-08-16 VITALS
WEIGHT: 215 LBS | OXYGEN SATURATION: 96 % | HEART RATE: 70 BPM | DIASTOLIC BLOOD PRESSURE: 73 MMHG | SYSTOLIC BLOOD PRESSURE: 135 MMHG | RESPIRATION RATE: 18 BRPM | HEIGHT: 66 IN | BODY MASS INDEX: 34.55 KG/M2 | TEMPERATURE: 99 F

## 2023-08-16 DIAGNOSIS — S70.01XD CONTUSION OF RIGHT HIP, SUBSEQUENT ENCOUNTER: ICD-10-CM

## 2023-08-16 DIAGNOSIS — Z02.6 ENCOUNTER RELATED TO WORKER'S COMPENSATION CLAIM: Primary | ICD-10-CM

## 2023-08-16 DIAGNOSIS — M54.50 ACUTE LOW BACK PAIN WITHOUT SCIATICA, UNSPECIFIED BACK PAIN LATERALITY: ICD-10-CM

## 2023-08-16 PROCEDURE — 99213 OFFICE O/P EST LOW 20 MIN: CPT | Mod: S$GLB,,,

## 2023-08-16 PROCEDURE — 99213 PR OFFICE/OUTPT VISIT, EST, LEVL III, 20-29 MIN: ICD-10-PCS | Mod: S$GLB,,,

## 2023-08-16 NOTE — LETTER
[facility]  [address] [address]  Phone: [phone]  Fax: [phone]  [facility]: [id number]    Pt Name: Moi Matta  Injury Date: 08/08/2023   Employee ID: [id number] Date of Treatment: 08/16/2023   Company: [employer]      Appointment Time: 11:45 AM Arrived: 12:20pm   Provider: Tuan Cuellar, DO Time Out:2:30pm     Office Treatment:   1. Encounter related to worker's compensation claim    2. Contusion of right hip, subsequent encounter    3. Acute low back pain without sciatica, unspecified back pain laterality               Restrictions: No lifting/pushing/pulling more than 10 lbs (Sit or stand stretch breaks as needed)     Return Appointment: 8/23/2023 at 10:45am

## 2023-08-16 NOTE — PROGRESS NOTES
Subjective:      Patient ID: Moi Matta is a 62 y.o. male.    Chief Complaint: Back Pain    See MA note.  Moi describes decreased pain to his lower back with use over-the-counter medication.  However, he acknowledges decreased tolerance to prolonged walking which results in increased lower back pain.  His workplace has been accommodating the work restrictions that have been prescribed.  Overall he does feel improved since his last evaluation    Patient's place of employment - [employer]  Patient's job title -   Date of Injury - 08/08/23  Body part injured - Low back  Current work status per last visit - Light  Improved, same, or worse - Improved  Pain Scale right now (1-10) -  5    Back Pain  Pertinent negatives include no bladder incontinence, bowel incontinence or numbness.       Constitution: Positive for activity change.   HENT: Negative.     Neck: neck negative. Negative for neck pain and neck stiffness.   Cardiovascular: Negative.    Eyes: Negative.    Respiratory: Negative.     Gastrointestinal: Negative.  Negative for bowel incontinence.   Endocrine: negative.   Genitourinary: Negative.  Negative for bladder incontinence.   Musculoskeletal:  Positive for pain, abnormal ROM of joint, back pain, pain with walking and muscle ache.   Skin: Negative.  Negative for erythema and bruising.   Neurological:  Negative for numbness and tingling.     Objective:     Physical Exam  Vitals and nursing note reviewed.   Constitutional:       General: He is not in acute distress.     Appearance: Normal appearance.   HENT:      Head: Normocephalic.   Eyes:      General: Lids are normal.      Conjunctiva/sclera: Conjunctivae normal.   Musculoskeletal:      Cervical back: No pain with movement.      Lumbar back: Tenderness present. No swelling, deformity or spasms. Decreased range of motion.        Back:       Comments: No gross deformity noted to the lumbar spine.  No overlying skin changes such as swelling,  erythema or bruising.  Pain to the right lower lumbar region both on palpation during range of motion assessment.  His range of motion does appear to be improving since my last evaluation.  Some difficulty transitioning positions from sitting to standing and on and off the examination table.   Skin:     General: Skin is warm.      Capillary Refill: Capillary refill takes less than 2 seconds.      Findings: No erythema.   Neurological:      Mental Status: He is alert and oriented to person, place, and time.   Psychiatric:         Attention and Perception: Attention normal.         Mood and Affect: Mood and affect normal.         Speech: Speech normal.         Behavior: Behavior normal. Behavior is cooperative.        Assessment:      1. Encounter related to worker's compensation claim    2. Contusion of right hip, subsequent encounter    3. Acute low back pain without sciatica, unspecified back pain laterality      Plan:   Moi's lower back symptoms are slowly improving but still with some pain especially with prolonged walking.  We will continue with current over-the-counter treatment regimen as well as work limitations for now.  I did speak with the  indicating that if Moi is not much improved upon follow-up then may need to consider physical therapy to help with his symptoms.     I spent a total of 20 minutes on the day of the visit.This includes face to face time and non-face to face time preparing to see the patient (eg, review of tests), obtaining and/or reviewing separately obtained history, documenting clinical information in the electronic or other health record, independently interpreting results and communicating results to the patient/family/caregiver, or care coordinator.            Restrictions: No lifting/pushing/pulling more than 10 lbs (Sit or stand stretch breaks as needed)  Follow up in about 1 week (around 8/23/2023).

## 2023-08-21 ENCOUNTER — TELEPHONE (OUTPATIENT)
Dept: SMOKING CESSATION | Facility: CLINIC | Age: 63
End: 2023-08-21
Payer: COMMERCIAL

## 2023-08-21 NOTE — TELEPHONE ENCOUNTER
Called pt after 10 mins no show to clinic appt with Smoking Cessation. Pt answered phone and asked to reschedule for next week. Will reschedule him.for Wed Aug 30th at 3pm.

## 2023-08-23 ENCOUNTER — OFFICE VISIT (OUTPATIENT)
Dept: URGENT CARE | Facility: CLINIC | Age: 63
End: 2023-08-23
Payer: OTHER MISCELLANEOUS

## 2023-08-23 VITALS
OXYGEN SATURATION: 97 % | DIASTOLIC BLOOD PRESSURE: 76 MMHG | BODY MASS INDEX: 34.55 KG/M2 | WEIGHT: 215 LBS | TEMPERATURE: 98 F | HEIGHT: 66 IN | HEART RATE: 71 BPM | SYSTOLIC BLOOD PRESSURE: 141 MMHG

## 2023-08-23 DIAGNOSIS — M54.50 ACUTE LOW BACK PAIN WITHOUT SCIATICA, UNSPECIFIED BACK PAIN LATERALITY: Primary | ICD-10-CM

## 2023-08-23 PROCEDURE — 99213 PR OFFICE/OUTPT VISIT, EST, LEVL III, 20-29 MIN: ICD-10-PCS | Mod: S$GLB,,,

## 2023-08-23 PROCEDURE — 99213 OFFICE O/P EST LOW 20 MIN: CPT | Mod: S$GLB,,,

## 2023-08-23 NOTE — LETTER
[facility]  [address] [address]  Phone: [phone]  Fax: [phone]  [facility]: [id number]    Pt Name: Moi Matta  Injury Date: 08/08/2023   Employee ID: [id number] Date of Treatment: 08/23/2023   Company: [employer]      Appointment Time: 10:45am Arrived: 10:40am   Provider: Tuan Cuellar DO Time Out:11:35am     Office Treatment:   1. Acute low back pain without sciatica, unspecified back pain laterality               Restrictions: Regular Duty, Discharged from Occupational Health     Return if symptoms worsen or fail to improve.

## 2023-08-23 NOTE — PROGRESS NOTES
Subjective:      Patient ID: Moi Matta is a 62 y.o. male.    Chief Complaint: Back Pain    See MA note.  Moi lower back symptoms are much improved today.  He is experienced some soreness to the right lower back but not any pain.  Denies any radicular symptoms or bowel or bladder issues.    Patient's place of employment - [email or web address]  Patient's job title -   Date of Injury - 08/08/23  Body part injured - Low back  Current work status per last visit - Light  Improved, same, or worse - Improved  Pain Scale right now (1-10) -  2      Back Pain  Pertinent negatives include no bladder incontinence, bowel incontinence or numbness.       Constitution: Negative for activity change.   HENT: Negative.     Neck: neck negative. Negative for neck pain and neck stiffness.   Cardiovascular: Negative.    Eyes: Negative.    Respiratory: Negative.     Gastrointestinal: Negative.  Negative for bowel incontinence.   Endocrine: negative.   Genitourinary: Negative.  Negative for bladder incontinence.   Musculoskeletal:  Positive for pain, abnormal ROM of joint, back pain and pain with walking. Negative for muscle ache.   Skin: Negative.  Negative for erythema and bruising.   Neurological:  Negative for numbness and tingling.   Psychiatric/Behavioral:  Negative for sleep disturbance.      Objective:     Physical Exam  Vitals and nursing note reviewed.   Constitutional:       General: He is not in acute distress.     Appearance: Normal appearance.   HENT:      Head: Normocephalic.   Eyes:      General: Lids are normal.      Conjunctiva/sclera: Conjunctivae normal.   Musculoskeletal:      Cervical back: No pain with movement.      Lumbar back: No swelling, deformity, spasms or tenderness. Normal range of motion. Negative right straight leg raise test and negative left straight leg raise test.        Back:       Comments: No gross deformity noted to lumbar spine.  He describes soreness to the right lower lumbar region during  range of motion assessment.  Soreness is not any worse with palpation.  Otherwise he has full range of motion of the lumbar spine.  He is able to stand up from seated position and climb on and off the examination table without difficulty or assistance.  Symmetrical lower extremity strength and reflexes bilaterally.   Skin:     General: Skin is warm.      Capillary Refill: Capillary refill takes less than 2 seconds.      Findings: No erythema.   Neurological:      General: No focal deficit present.      Mental Status: He is alert.      Deep Tendon Reflexes: Reflexes are normal and symmetric.   Psychiatric:         Attention and Perception: Attention normal.         Mood and Affect: Mood and affect normal.         Speech: Speech normal.         Behavior: Behavior normal. Behavior is cooperative.        Assessment:      1. Acute low back pain without sciatica, unspecified back pain laterality      Plan:   Moi is much improved both subjectively and clinically with just conservative treatment measures.  I anticipate he will continue to have some soreness to his lower back which will improve over the next few days.  Functionally no limitations on examination.  Therefore, I will release him to regular duty status.  He is aware he can follow up should his symptoms unexpectedly worsen.  He was accompanied by his  today.    I spent a total of 20 minutes on the day of the visit.This includes face to face time and non-face to face time preparing to see the patient (eg, review of tests), obtaining and/or reviewing separately obtained history, documenting clinical information in the electronic or other health record, independently interpreting results and communicating results to the patient/family/caregiver, or care coordinator.            Restrictions: Regular Duty, Discharged from Occupational Health  Follow up if symptoms worsen or fail to improve.

## 2023-08-30 ENCOUNTER — TELEPHONE (OUTPATIENT)
Dept: SMOKING CESSATION | Facility: CLINIC | Age: 63
End: 2023-08-30
Payer: COMMERCIAL

## 2023-08-30 NOTE — TELEPHONE ENCOUNTER
Called pt after no show to in-clinic appt with Smoking Cessation. Pt states he is not ready to quit at this time.